# Patient Record
(demographics unavailable — no encounter records)

---

## 2025-07-22 NOTE — HISTORY OF PRESENT ILLNESS
[de-identified] : DAX NORRIS  is a 36 year old man who presents to the [facility] with a chief complaint of throat pain.   He is referred by ~  ~.   They have had pain in their throat for ~  ~ months. They ~  ~ have a prior CT neck. They ~  ~ have prior smoking history. They ~  ~ have prior history of alcoholism/alcohol abuse. Throat pain is ~  ~ when swallowing solids or liquids. They ~  ~ have weight loss in the past 3 months. They ~  ~ have to alter their diet because of this pain. They ~  ~ have frequent ear pain. Pain is ~  ~ when chewing. They ~  ~ have a prior swallow study in the past 1 year. They have seen the following types of providers for this problem: ~  ~. They have taken the following medications for this problem: ~  ~ Doing or taking the following makes the pain better: ~  ~ Doing the following makes the pain worse: ~  ~